# Patient Record
Sex: FEMALE | Race: BLACK OR AFRICAN AMERICAN
[De-identification: names, ages, dates, MRNs, and addresses within clinical notes are randomized per-mention and may not be internally consistent; named-entity substitution may affect disease eponyms.]

---

## 2019-10-04 ENCOUNTER — HOSPITAL ENCOUNTER (EMERGENCY)
Dept: HOSPITAL 62 - ER | Age: 59
Discharge: HOME | End: 2019-10-04
Payer: COMMERCIAL

## 2019-10-04 VITALS — DIASTOLIC BLOOD PRESSURE: 89 MMHG | SYSTOLIC BLOOD PRESSURE: 157 MMHG

## 2019-10-04 DIAGNOSIS — Z79.899: ICD-10-CM

## 2019-10-04 DIAGNOSIS — F17.210: ICD-10-CM

## 2019-10-04 DIAGNOSIS — J98.01: Primary | ICD-10-CM

## 2019-10-04 DIAGNOSIS — R42: ICD-10-CM

## 2019-10-04 DIAGNOSIS — I10: ICD-10-CM

## 2019-10-04 LAB
ADD MANUAL DIFF: NO
ALBUMIN SERPL-MCNC: 4.5 G/DL (ref 3.5–5)
ALP SERPL-CCNC: 67 U/L (ref 38–126)
ANION GAP SERPL CALC-SCNC: 10 MMOL/L (ref 5–19)
APPEARANCE UR: (no result)
APTT PPP: YELLOW S
AST SERPL-CCNC: 24 U/L (ref 14–36)
BARBITURATES UR QL SCN: NEGATIVE
BASOPHILS # BLD AUTO: 0.1 10^3/UL (ref 0–0.2)
BASOPHILS NFR BLD AUTO: 0.9 % (ref 0–2)
BILIRUB DIRECT SERPL-MCNC: 0 MG/DL (ref 0–0.4)
BILIRUB SERPL-MCNC: 1.1 MG/DL (ref 0.2–1.3)
BILIRUB UR QL STRIP: NEGATIVE
BUN SERPL-MCNC: 12 MG/DL (ref 7–20)
CALCIUM: 9.7 MG/DL (ref 8.4–10.2)
CHLORIDE SERPL-SCNC: 98 MMOL/L (ref 98–107)
CK SERPL-CCNC: 51 U/L (ref 30–135)
CO2 SERPL-SCNC: 30 MMOL/L (ref 22–30)
EOSINOPHIL # BLD AUTO: 0.1 10^3/UL (ref 0–0.6)
EOSINOPHIL NFR BLD AUTO: 1.5 % (ref 0–6)
ERYTHROCYTE [DISTWIDTH] IN BLOOD BY AUTOMATED COUNT: 15.3 % (ref 11.5–14)
FREE T4 (FREE THYROXINE): 0.87 NG/DL (ref 0.78–2.19)
GLUCOSE SERPL-MCNC: 193 MG/DL (ref 75–110)
GLUCOSE UR STRIP-MCNC: NEGATIVE MG/DL
HCT VFR BLD CALC: 48.5 % (ref 36–47)
HGB BLD-MCNC: 16.1 G/DL (ref 12–15.5)
KETONES UR STRIP-MCNC: (no result) MG/DL
LYMPHOCYTES # BLD AUTO: 1.6 10^3/UL (ref 0.5–4.7)
LYMPHOCYTES NFR BLD AUTO: 20.6 % (ref 13–45)
MCH RBC QN AUTO: 28.9 PG (ref 27–33.4)
MCHC RBC AUTO-ENTMCNC: 33.2 G/DL (ref 32–36)
MCV RBC AUTO: 87 FL (ref 80–97)
METHADONE UR QL SCN: NEGATIVE
MONOCYTES # BLD AUTO: 0.6 10^3/UL (ref 0.1–1.4)
MONOCYTES NFR BLD AUTO: 7.9 % (ref 3–13)
NEUTROPHILS # BLD AUTO: 5.4 10^3/UL (ref 1.7–8.2)
NEUTS SEG NFR BLD AUTO: 69.1 % (ref 42–78)
NITRITE UR QL STRIP: NEGATIVE
PCP UR QL SCN: NEGATIVE
PH UR STRIP: 5 [PH] (ref 5–9)
PLATELET # BLD: 202 10^3/UL (ref 150–450)
POTASSIUM SERPL-SCNC: 3.8 MMOL/L (ref 3.6–5)
PROT SERPL-MCNC: 7.6 G/DL (ref 6.3–8.2)
PROT UR STRIP-MCNC: 100 MG/DL
RBC # BLD AUTO: 5.58 10^6/UL (ref 3.72–5.28)
SP GR UR STRIP: 1.03
T3FREE SERPL-MCNC: 4.23 PG/ML (ref 2.77–5.27)
TOTAL CELLS COUNTED % (AUTO): 100 %
TSH SERPL-ACNC: 0.34 UIU/ML (ref 0.47–4.68)
URINE AMPHETAMINES SCREEN: NEGATIVE
URINE BENZODIAZEPINES SCREEN: NEGATIVE
URINE COCAINE SCREEN: NEGATIVE
URINE MARIJUANA (THC) SCREEN: (no result)
UROBILINOGEN UR-MCNC: NEGATIVE MG/DL (ref ?–2)
WBC # BLD AUTO: 7.8 10^3/UL (ref 4–10.5)

## 2019-10-04 PROCEDURE — 82553 CREATINE MB FRACTION: CPT

## 2019-10-04 PROCEDURE — 84481 FREE ASSAY (FT-3): CPT

## 2019-10-04 PROCEDURE — 80307 DRUG TEST PRSMV CHEM ANLYZR: CPT

## 2019-10-04 PROCEDURE — 94640 AIRWAY INHALATION TREATMENT: CPT

## 2019-10-04 PROCEDURE — 99284 EMERGENCY DEPT VISIT MOD MDM: CPT

## 2019-10-04 PROCEDURE — 36415 COLL VENOUS BLD VENIPUNCTURE: CPT

## 2019-10-04 PROCEDURE — 82550 ASSAY OF CK (CPK): CPT

## 2019-10-04 PROCEDURE — 83690 ASSAY OF LIPASE: CPT

## 2019-10-04 PROCEDURE — 84443 ASSAY THYROID STIM HORMONE: CPT

## 2019-10-04 PROCEDURE — 80053 COMPREHEN METABOLIC PANEL: CPT

## 2019-10-04 PROCEDURE — 84439 ASSAY OF FREE THYROXINE: CPT

## 2019-10-04 PROCEDURE — 70450 CT HEAD/BRAIN W/O DYE: CPT

## 2019-10-04 PROCEDURE — 85025 COMPLETE CBC W/AUTO DIFF WBC: CPT

## 2019-10-04 PROCEDURE — 81001 URINALYSIS AUTO W/SCOPE: CPT

## 2019-10-04 NOTE — ER DOCUMENT REPORT
ED Medical Screen (RME)





- General


Chief Complaint: Dizziness


Stated Complaint: BLOOD PRESSURE ISSUES


Time Seen by Provider: 10/04/19 13:24


Primary Care Provider: 


ELIZABETH OTERO DO [Primary Care Provider] - Follow up as needed


Mode of Arrival: Ambulatory


Information source: Patient


Notes: 





59-year-old female presents to ED for complaint of headache blurry vision dizzi

ness and elevated blood pressure.  She states she is on metoprolol but sometimes

she forgets to take her medications.  She states the dizziness is been going on 

since morning.  She also has a history of high blood pressure and thyroid 

disease.  She smokes 4 cigarettes a day drinks 1-2 day smokes a little pot and 

works at [employer].  She lives alone.  Patient is alert oriented respirations 

regular and unlabored speaking in full sentences walks with even steady gait. 

















I have greeted and performed a rapid initial assessment of this patient.  A 

comprehensive ED assessment and evaluation of the patient, analysis of test 

results and completion of medical decision making process will be conducted by 

an additional ED providers.


TRAVEL OUTSIDE OF THE [place] IN LAST 30 DAYS: No





- Related Data


Allergies/Adverse Reactions: 


                                        





No Known Allergies Allergy (Verified 04/08/15 09:58)


   











Past Medical History





- Past Medical History


Cardiac Medical History: 


   Denies: Hx Coronary Artery Disease, Hx Heart Attack, Hx Hypertension


Pulmonary Medical History: Reports: Hx Bronchitis, Hx Pneumonia - as a child


   Denies: Hx Asthma, Hx COPD, Hx Tuberculosis


Neurological Medical History: Denies: Hx Cerebrovascular Accident, Hx Seizures


Endocrine Medical History: Denies: Hx Diabetes Mellitus Type 1, Hx Diabetes 

Mellitus Type 2


Musculoskeltal Medical History: Reports Hx Arthritis - B/L shoulders, hands, 

fingers


Past Surgical History: Reports: Hx  Section.  Denies: Hx Hysterectomy, 

Hx Pacemaker





- Immunizations


Hx Diphtheria, Pertussis, Tetanus Vaccination: Yes





Physical Exam





- Vital signs


Vitals: 





                                        











Temp Pulse Resp BP Pulse Ox


 


 98.4 F   70   18   181/96 H  95 


 


 10/04/19 12:56  10/04/19 12:56  10/04/19 12:56  10/04/19 12:56  10/04/19 12:56














Course





- Vital Signs


Vital signs: 





                                        











Temp Pulse Resp BP Pulse Ox


 


 98.4 F   70   18   181/96 H  95 


 


 10/04/19 12:56  10/04/19 12:56  10/04/19 12:56  10/04/19 12:56  10/04/19 12:56














Doctor's Discharge





- Discharge


Referrals: 


ELIZABETH OTERO DO [Primary Care Provider] - Follow up as needed

## 2019-10-04 NOTE — RADIOLOGY REPORT (SQ)
EXAM DESCRIPTION:  CT HEAD WITHOUT



COMPLETED DATE/TIME:  10/4/2019 6:27 pm



REASON FOR STUDY:  dizziness, eval for CVA



COMPARISON:  None.



TECHNIQUE:  Axial images acquired through the brain without intravenous contrast.  Images reviewed wi
th bone, brain and subdural windows.  Additional sagittal and coronal reconstructions were generated.
 Images stored on PACS.

All CT scanners at this facility use dose modulation, iterative reconstruction, and/or weight based d
osing when appropriate to reduce radiation dose to as low as reasonably achievable (ALARA).

[facility]: Dose Right  [facility]: CareDose    [facility]: Dose Right    [facility]: Teradose 4D    [facility]: Smart Typesafe



RADIATION DOSE:  CT Rad equipment meets quality standard of care and radiation dose reduction techniq
ues were employed. CTDIvol: 53.2 mGy. DLP: 937 mGy-cm. mGy.



LIMITATIONS:  None.



FINDINGS:  VENTRICLES: Normal size and contour.

CEREBRUM: No masses.  No hemorrhage.  No midline shift.  No evidence for acute infarction. Normal gra
y/white matter differentiation. No areas of low density in the white matter.

CEREBELLUM: No masses.  No hemorrhage.  No alteration of density.  No evidence for acute infarction.

EXTRAAXIAL SPACES: No fluid collections.  No masses.

ORBITS AND GLOBE: No intra- or extraconal masses.  Normal contour of globe without masses.

CALVARIUM: No fracture.

PARANASAL SINUSES: No fluid or mucosal thickening.

SOFT TISSUES: No mass or hematoma.

OTHER: No other significant finding.



IMPRESSION:  NORMAL BRAIN CT WITHOUT CONTRAST.

EVIDENCE OF ACUTE STROKE: NO.



COMMENT:  Quality ID # [id number]: Final reports with documentation of one or more dose reduction techniques
 (e.g., Automated exposure control, adjustment of the mA and/or kV according to patient size, use of 
iterative reconstruction technique)



TECHNICAL DOCUMENTATION:  JOB ID:  [id number]

 2011 [employer]- All Rights Reserved



Reading location - IP/workstation name: [email or web address]

## 2019-10-05 NOTE — ER DOCUMENT REPORT
Entered by MARITZA PEREZ SCRIBE  10/04/19 1724 





Acting as scribe for:JEET SCHWAB DO





ED General





- General


Chief Complaint: Dizziness


Stated Complaint: BLOOD PRESSURE ISSUES


Time Seen by Provider: 10/04/19 13:24


Primary Care Provider: 


ELIZABETH OTERO DO [NO LOCAL MD] - Follow up in 3-5 days


Mode of Arrival: Ambulatory


Information source: Patient


Notes: 





Patient is a 59-year-old female who presents to the emergency department today 

with complaints of elevated blood pressures today.  Patient states she takes 

metoprolol for blood pressure and has not missed any medications.  Patient 

states her endocrinologist took her off of another blood pressure medicine that 

she was on but she cannot remember the name of it.  Patient states she was taken

off several months ago.  Patient states her blood pressure had been under 

control until today.  Patient states she did eat lots of salt last night.  

Patient states she does have history of asthma and has been wheezing but not 

more than normal.  Patient denies dysuria.


TRAVEL OUTSIDE OF THE [place] IN LAST 30 DAYS: No





- Related Data


Allergies/Adverse Reactions: 


                                        





No Known Allergies Allergy (Verified 10/04/19 13:28)


   











Past Medical History





- General


Information source: Patient





- Social History


Smoking Status: Current Every Day Smoker


Cigarette use (# per day): Yes


Chew tobacco use (# tins/day): No


Frequency of alcohol use: None


Drug Abuse: Marijuana


Lives with: Family


Family History: Reviewed & Not Pertinent


Patient has suicidal ideation: No


Patient has homicidal ideation: No


Pulmonary Medical History: Reports: Hx Bronchitis, Hx Pneumonia - as a child


Musculoskeletal Medical History: Reports Hx Arthritis - B/L shoulders, hands, 

fingers


Past Surgical History: Reports: Hx  Section





- Immunizations


Hx Diphtheria, Pertussis, Tetanus Vaccination: Yes





Review of Systems





- Review of Systems


Constitutional: No symptoms reported


EENT: denies: Blurred vision, Double vision


Cardiovascular: See HPI, Other - elevated blood pressure.  denies: Dizziness


Respiratory: No symptoms reported


Gastrointestinal: No symptoms reported


Genitourinary: No symptoms reported


Female Genitourinary: No symptoms reported


Musculoskeletal: No symptoms reported


Skin: No symptoms reported


Hematologic/Lymphatic: No symptoms reported


Neurological/Psychological: No symptoms reported


-: Yes All other systems reviewed and negative





Physical Exam





- Vital signs


Vitals: 


                                        











Temp Pulse Resp BP Pulse Ox


 


 98.4 F   70   18   181/96 H  95 


 


 10/04/19 12:56  10/04/19 12:56  10/04/19 12:56  10/04/19 12:56  10/04/19 12:56











Interpretation: Normal





- General


General appearance: Appears well, Alert





- HEENT


Head: Normocephalic, Atraumatic


Eyes: Normal


Pupils: PERRL





- Respiratory


Respiratory status: No respiratory distress


Chest status: Nontender


Breath sounds: Wheezing


Chest palpation: Normal





- Cardiovascular


Rhythm: Regular


Heart sounds: Normal auscultation


Murmur: No





- Abdominal


Inspection: Normal


Distension: No distension


Bowel sounds: Normal


Tenderness: Nontender


Organomegaly: No organomegaly





- Back


Back: Normal, Nontender





- Extremities


General upper extremity: Normal inspection, Nontender, Normal color, Normal ROM,

Normal temperature


General lower extremity: Normal inspection, Nontender, Normal color, Normal ROM,

Normal temperature, Normal weight bearing.  No: Heladio's sign





- Neurological


Neuro grossly intact: Yes


Cognition: Normal


Orientation: AAOx4


[place] Coma Scale Eye Opening: Spontaneous


Ranjith Coma Scale Verbal: Oriented


[place] Coma Scale Motor: Obeys Commands


[place] Coma Scale Total: 15


Speech: Normal


Motor strength normal: LUE, RUE, LLE, RLE


Sensory: Normal





- Psychological


Associated symptoms: Normal affect, Normal mood





- Skin


Skin Temperature: Warm


Skin Moisture: Dry


Skin Color: Normal





Course





- Re-evaluation


Re-evalutation: 





10/04/19


Patient is a 5-year-old female who comes in with intermittent dizziness.  None c

urrently.  Neurologic exam benign.  No acute findings on head CT.  Blood work 

benign.  Patient does have wheezing.  She has a history of asthma and smokes a 

pack a day.  She is advised to quit.  No hypoxia.  No productive cough.  Patient

will be discharged home with medications for asthma and also for blood pressure.

 She is not sure what secondary to medication she took in addition to 

metoprolol.  Patient is to follow-up with her doctor and follow-up with 

pulmonology for pulmonary function test which it sounds like she never had done.

 Advised strongly to stop smoking.  Understands and agrees with plan.  Stable 

for discharge.  Grateful for care.








- Vital Signs


Vital signs: 


                                        











Temp Pulse Resp BP Pulse Ox


 


 98.1 F   81   20   157/87 H  97 


 


 10/04/19 19:45  10/04/19 19:45  10/04/19 19:45  10/04/19 19:45  10/04/19 19:45














- Laboratory


Result Diagrams: 


                                 10/04/19 13:47





                                 10/04/19 13:47


Laboratory results interpreted by me: 


                                        











  10/04/19 10/04/19 10/04/19





  13:30 13:47 13:47


 


RBC   5.58 H 


 


Hgb   16.1 H 


 


Hct   48.5 H 


 


RDW   15.3 H 


 


Creatinine    0.51 L


 


Glucose    193 H


 


TSH   


 


Urine Protein  100 H  


 


Urine Ketones  TRACE H  


 


Urine Blood  MODERATE H  


 


Ur Leukocyte Esterase  TRACE H  














  10/04/19





  13:47


 


RBC 


 


Hgb 


 


Hct 


 


RDW 


 


Creatinine 


 


Glucose 


 


TSH  0.34 L


 


Urine Protein 


 


Urine Ketones 


 


Urine Blood 


 


Ur Leukocyte Esterase 














- Diagnostic Test


Radiology reviewed: Reports reviewed





Discharge





- Discharge


Clinical Impression: 


 Bronchospasm





High blood pressure


Qualifiers:


 Hypertension type: unspecified Qualified Code(s): I10 - Essential (primary) 

hypertension





Condition: Stable


Disposition: HOME, SELF-CARE


Prescriptions: 


Hydralazine HCl [Apresoline 10 mg Tablet] 10 mg PO TID #90 tab


Prednisone [Deltasone 20 mg Tablet] 40 mg PO DAILY #6 tablet


Ipratropium/Albuterol Sulfate [Duoneb 3 ml Ampul] 3 ml NEB [id number] PRN #30 

vial.neb


 PRN Reason: 


Nebulizer [Nebulizer Machine] 1 each MC ASDIR PRN #1 kit


 PRN Reason: 


Albuterol Sulfate [Proair HFA Inhalation Aerosol 8.5 gm MDI] 2 puff IH Q4H PRN 

#1 mdi


 PRN Reason: 


Forms:  Return to Work


Referrals: 


ELIZABETH OTERO DO [NO LOCAL MD] - Follow up in 3-5 days





I personally performed the services described in the documentation, reviewed and

edited the documentation which was dictated to the scribe in my presence, and it

accurately records my words and actions.